# Patient Record
Sex: FEMALE | Race: WHITE | NOT HISPANIC OR LATINO | Employment: OTHER | URBAN - METROPOLITAN AREA
[De-identification: names, ages, dates, MRNs, and addresses within clinical notes are randomized per-mention and may not be internally consistent; named-entity substitution may affect disease eponyms.]

---

## 2022-01-01 ENCOUNTER — HOSPITAL ENCOUNTER (EMERGENCY)
Facility: CLINIC | Age: 87
Discharge: HOME | End: 2022-05-23
Attending: EMERGENCY MEDICINE | Admitting: EMERGENCY MEDICINE
Payer: OTHER MISCELLANEOUS

## 2022-01-01 ENCOUNTER — APPOINTMENT (OUTPATIENT)
Dept: CT IMAGING | Facility: CLINIC | Age: 87
End: 2022-01-01
Attending: EMERGENCY MEDICINE
Payer: OTHER MISCELLANEOUS

## 2022-01-01 VITALS
OXYGEN SATURATION: 90 % | TEMPERATURE: 97.3 F | DIASTOLIC BLOOD PRESSURE: 48 MMHG | HEART RATE: 64 BPM | SYSTOLIC BLOOD PRESSURE: 97 MMHG | RESPIRATION RATE: 20 BRPM

## 2022-01-01 DIAGNOSIS — R11.15 CYCLICAL VOMITING SYNDROME NOT ASSOCIATED WITH MIGRAINE: ICD-10-CM

## 2022-01-01 DIAGNOSIS — Z51.5 HOSPICE CARE PATIENT: ICD-10-CM

## 2022-01-01 DIAGNOSIS — K59.00 CONSTIPATION, UNSPECIFIED CONSTIPATION TYPE: ICD-10-CM

## 2022-01-01 PROCEDURE — 99284 EMERGENCY DEPT VISIT MOD MDM: CPT | Mod: 25

## 2022-01-01 PROCEDURE — 258N000003 HC RX IP 258 OP 636: Performed by: EMERGENCY MEDICINE

## 2022-01-01 PROCEDURE — 96374 THER/PROPH/DIAG INJ IV PUSH: CPT

## 2022-01-01 PROCEDURE — 250N000011 HC RX IP 250 OP 636: Performed by: EMERGENCY MEDICINE

## 2022-01-01 PROCEDURE — 96361 HYDRATE IV INFUSION ADD-ON: CPT

## 2022-01-01 PROCEDURE — 250N000013 HC RX MED GY IP 250 OP 250 PS 637: Performed by: EMERGENCY MEDICINE

## 2022-01-01 PROCEDURE — G1004 CDSM NDSC: HCPCS

## 2022-01-01 RX ORDER — SODIUM CHLORIDE 9 MG/ML
INJECTION, SOLUTION INTRAVENOUS CONTINUOUS
Status: DISCONTINUED | OUTPATIENT
Start: 2022-01-01 | End: 2022-01-01 | Stop reason: HOSPADM

## 2022-01-01 RX ORDER — ONDANSETRON 2 MG/ML
4 INJECTION INTRAMUSCULAR; INTRAVENOUS EVERY 30 MIN PRN
Status: DISCONTINUED | OUTPATIENT
Start: 2022-01-01 | End: 2022-01-01 | Stop reason: HOSPADM

## 2022-01-01 RX ORDER — ONDANSETRON 4 MG/1
4 TABLET, ORALLY DISINTEGRATING ORAL ONCE
Status: COMPLETED | OUTPATIENT
Start: 2022-01-01 | End: 2022-01-01

## 2022-01-01 RX ORDER — MORPHINE SULFATE 4 MG/ML
3 INJECTION, SOLUTION INTRAMUSCULAR; INTRAVENOUS
Status: DISCONTINUED | OUTPATIENT
Start: 2022-01-01 | End: 2022-01-01 | Stop reason: HOSPADM

## 2022-01-01 RX ORDER — ONDANSETRON 4 MG/1
4 TABLET, ORALLY DISINTEGRATING ORAL ONCE
Status: DISCONTINUED | OUTPATIENT
Start: 2022-01-01 | End: 2022-01-01 | Stop reason: HOSPADM

## 2022-01-01 RX ADMIN — SODIUM CHLORIDE 1000 ML: 9 INJECTION, SOLUTION INTRAVENOUS at 07:24

## 2022-01-01 RX ADMIN — SODIUM CHLORIDE: 9 INJECTION, SOLUTION INTRAVENOUS at 08:35

## 2022-01-01 RX ADMIN — ONDANSETRON 4 MG: 4 TABLET, ORALLY DISINTEGRATING ORAL at 05:37

## 2022-01-01 RX ADMIN — MAGNESIUM CITRATE 286 ML: 1.75 LIQUID ORAL at 08:17

## 2022-01-01 RX ADMIN — ONDANSETRON 4 MG: 2 INJECTION INTRAMUSCULAR; INTRAVENOUS at 07:24

## 2022-05-23 NOTE — ED TRIAGE NOTES
Pt arrives from her son's home where she lives and receives hospice care. Pt's son at bedside, reports hx of breast cancer w/ mets to the bone for the last year. Reports hospice nurse has attempted 2 enemas over the last 2 days w/ no success in having a BM. Reports decreased appetite, increasing abd pain, and inability to have BM. Vomited noted on pt's gown, pt reporting that her belly is swollen and uncomfortable.

## 2022-05-23 NOTE — DISCHARGE INSTRUCTIONS
Please come back to the ER for any other concerns or symptoms that he cannot manage at home.  Please do follow with your hospice team, and ask about additional stool softeners to take at home.  Come back with any other concerns

## 2022-05-23 NOTE — ED PROVIDER NOTES
History   Chief Complaint:  Abdominal Pain and Constipation       The history is provided by a relative. The history is limited by the condition of the patient.      Chloe Grijalva is a 89 year old female with history of metastatic breast cancer who presents with her son for evaluation of abdominal pain. The patient's son reports that the patient has been in hospice care since July, and since increasing morphine 3 weeks ago has been experiencing constipation. He reports that she has been nauseous, vomiting, and constipated. He reports that she most recently vomited before arriving in the ED today.      Review of Systems   Unable to perform ROS: Acuity of condition       Allergies:  The patient has no known allergies.     Medications:  Dexamethasone  Methadone  Morphine    Past Medical History:     Secondary malignant neoplasm of bone  Anemia  Hypertension  Metastatic breast cancer   Hyperlipidemia    Social History:  Arrived via EMS.  Presents with son.     Physical Exam     Patient Vitals for the past 24 hrs:   BP Temp Temp src Pulse Resp SpO2   05/23/22 1045 99/51 -- -- 63 -- 91 %   05/23/22 1030 97/46 -- -- 58 -- 91 %   05/23/22 1015 103/54 -- -- 65 -- 90 %   05/23/22 1000 (!) 84/52 -- -- 65 -- 90 %   05/23/22 0945 94/49 -- -- 65 -- 90 %   05/23/22 0930 102/54 -- -- 67 -- (!) 89 %   05/23/22 0915 105/55 -- -- 75 -- 92 %   05/23/22 0900 108/51 -- -- 76 -- 93 %   05/23/22 0845 106/64 -- -- 76 -- 94 %   05/23/22 0830 115/53 -- -- 74 -- 94 %   05/23/22 0815 101/61 -- -- 77 -- --   05/23/22 0800 106/54 -- -- 74 -- 96 %   05/23/22 0745 116/56 -- -- 78 -- (!) 82 %   05/23/22 0730 120/65 -- -- 79 -- 95 %   05/23/22 0541 -- 97.3  F (36.3  C) Temporal -- -- --   05/23/22 0538 (!) 144/81 -- -- 83 20 93 %       Physical Exam  Vitals: reviewed by me  General: Pt seen on hospital [facility], pleasant, cooperative, and alert to conversation.  Very frail appearing, pale, sick appearing.  Eyes: Tracking well, clear conjunctiva  BL  ENT: MMM, midline trachea.   Lungs: No tachypnea, no accessory muscle use. No respiratory distress.  Lungs are clear to auscultation bilaterally  CV: Rate as above, normal S1-S2, no additional heart sounds.  Abd: Soft, mildly distended and tender throughout, no guarding, rebound  MSK: no joint effusion.  No evidence of trauma  Skin: No rash  Neuro: Clear speech and no facial droop.  Psych: Not RIS, no e/o AH/VH      Emergency Department Course     Imaging:  Abd/pelvis CT no contrast - Stone Protocol   Final Result   IMPRESSION:    1.  Airspace opacity in the left lower lobe may be related to   atelectasis or pneumonia.   2.  Moderate to marked age-indeterminate anterior compression of the   T11 vertebral body.   3.  The stomach is moderately distended with gas and fluid.   4.  Moderate amount of stool throughout the colon.      KD TOVAR MD            SYSTEM ID:  [id number]        Report per radiology    Emergency Department Course:    Reviewed:  I reviewed nursing notes, vitals and past medical history    Assessments:  0609 I obtained history and examined the patient as noted above.   0645 I spoke with Antonia, the hospice nurse, who tells me that she has been out to the patient's house over the weekend, felt the patient to be constipated, has tried several enemas, and even a manual disimpaction that was unsuccessful.  She heard that the patient was vomiting, and recommended transfer to the ER.  Is not seeking admission, and feels needs may still be managed at home.    Consults:  6014 I spoke with Roderick, , about placement to a skilled nursing facility from the ER.    Interventions:  0537 Zofran 4 mg oral  0724 0.9% sodium chloride BOLUS 1L IV   0724 Zofran 4 mg IV  0817 Enema compound 286 mL rectal    Disposition:  The patient was discharged to home.     Impression & Plan     Medical Decision Making:  Chloe Grijalva is a 89 year old female who presents the emergency room with an episode of vomiting  earlier today.  She also has constipation and is a hospice patient, and the son states that he was told to come in at the request of the hospice nurse.  I spoke to the hospice nurse, and it sounded the patient was sent here for second opinion.  After discussing the case with the hospice nurse, hospice social worker, Jeff, as well as the patient and her son, as a team we decided to send the patient back home where she can continue to get stool softeners and pain medication.  The hospice team can see her there, and also very, the , initially was trying to plan a transfer to a skilled nursing facility that could better manage the patient's constipation issues, though the son politely refused this and stated that he would like to have his mother come home.  If they were to be admitted here, unfortunately they would lose hospice status, and this is not desired.  The patient herself is doing well in the ER, and an enema was given at the request of the patient's son, though she has not had a bowel movement here.  She is resting comfortably, we understand there is hypoxia, and a possible pneumonia found on the CT scan, but treating these are nodding keeping with the patient's goals of care.  We will proceed with the sons a stated preference, and discharge the patient back home with increasing hospice resources.  Hospice nurse Antonia and various  are also intimately involved in this decision.    Diagnosis:    ICD-10-CM    1. Constipation, unspecified constipation type  K59.00    2. Cyclical vomiting syndrome not associated with migraine  R11.15    3. Hospice care patient  Z51.5        Scribe Disclosure:  I, Bayron Clay () and Carrillo Reyes (trainee), am serving as a scribe at 6:06 AM on 5/23/2022 to document services personally performed by Glenn Menendez MD based on my observations and the provider's statements to me.        Glenn Menendez MD  05/23/22  [id number]